# Patient Record
Sex: FEMALE | Race: OTHER | NOT HISPANIC OR LATINO | URBAN - METROPOLITAN AREA
[De-identification: names, ages, dates, MRNs, and addresses within clinical notes are randomized per-mention and may not be internally consistent; named-entity substitution may affect disease eponyms.]

---

## 2017-12-23 ENCOUNTER — EMERGENCY (EMERGENCY)
Facility: HOSPITAL | Age: 22
LOS: 1 days | Discharge: HOME | End: 2017-12-23
Attending: EMERGENCY MEDICINE | Admitting: EMERGENCY MEDICINE
Payer: COMMERCIAL

## 2017-12-23 VITALS
TEMPERATURE: 98 F | RESPIRATION RATE: 16 BRPM | OXYGEN SATURATION: 100 % | SYSTOLIC BLOOD PRESSURE: 144 MMHG | DIASTOLIC BLOOD PRESSURE: 84 MMHG | HEART RATE: 85 BPM

## 2017-12-23 PROCEDURE — 99282 EMERGENCY DEPT VISIT SF MDM: CPT

## 2017-12-23 PROCEDURE — 99283 EMERGENCY DEPT VISIT LOW MDM: CPT

## 2017-12-23 RX ORDER — ISOTRETINOIN 20 MG/1
0 CAPSULE, LIQUID FILLED ORAL
Qty: 0 | Refills: 0 | COMMUNITY

## 2017-12-23 NOTE — ED PROVIDER NOTE - OBJECTIVE STATEMENT
22 F no pmh, p/w throat pain, nasal congestion and L ear pain since last night. NO fever/chills. Took tylenol this AM with some relief. NO difficulty swallowing. No cough or respiratory distress. Clear nasal dicharge.

## 2017-12-23 NOTE — ED ADULT NURSE NOTE - OBJECTIVE STATEMENT
pt c/o sore throat and nasal congestion for one day.  no swallowing problems and no respiratory or speaking problems

## 2023-10-15 NOTE — ED ADULT NURSE NOTE - FINAL NURSING ELECTRONIC SIGNATURE
acetaminophen 325 mg oral tablet: 2 tab(s) orally every 6 hours, As Needed for mild to moderate pain  apixaban 2.5 mg oral tablet: 1 tab(s) orally 2 times a day  Calcium 600+D 600 mg-5 mcg (200 intl units) oral tablet: 1 tab(s) orally once a day  cefdinir 300 mg oral capsule: 1 cap(s) orally 2 times a day   cyclobenzaprine 5 mg oral tablet: 1 tab(s) orally 3 times a day, As needed, Muscle Spasm  gabapentin 100 mg oral capsule: 1 cap(s) orally 2 times a day  gabapentin 300 mg oral capsule: 1 cap(s) orally once a day (at bedtime)  levothyroxine 50 mcg (0.05 mg) oral tablet: 1 tab(s) orally once a day  lidocaine 4% topical film: Apply topically to affected area once a day   melatonin 3 mg oral tablet: 1 tab(s) orally once a day (at bedtime), As needed, Insomnia  metoprolol tartrate 50 mg oral tablet: 1 tab(s) orally 2 times a day  MiraLax oral powder for reconstitution: 17 gram(s) orally once a day, As Needed for constipation   Multiple Vitamins oral capsule: 1 cap(s) orally once a day  sodium chloride 1 g oral tablet: 1 tab(s) orally 3 times a day   spironolactone 25 mg oral tablet: 1 tab(s) orally once a day  traMADol 50 mg oral tablet: 1 tab(s) orally every 8 hours, As Needed for severe pain MDD:3 tabs  Vitamin C 500 mg oral tablet: 1 tab(s) orally once a day   acetaminophen 325 mg oral tablet: 2 tab(s) orally every 6 hours, As Needed for mild to moderate pain  apixaban 2.5 mg oral tablet: 1 tab(s) orally 2 times a day  bisacodyl 5 mg oral delayed release tablet: 1 tab(s) orally every 12 hours As needed Constipation  Calcium 600+D 600 mg-5 mcg (200 intl units) oral tablet: 1 tab(s) orally once a day  cyclobenzaprine 5 mg oral tablet: 1 tab(s) orally 3 times a day, As needed, Muscle Spasm  gabapentin 100 mg oral capsule: 1 cap(s) orally 2 times a day  gabapentin 300 mg oral capsule: 1 cap(s) orally once a day (at bedtime)  levothyroxine 50 mcg (0.05 mg) oral tablet: 1 tab(s) orally once a day  lidocaine 4% topical film: Apply topically to affected area once a day   metoprolol tartrate 50 mg oral tablet: 1 tab(s) orally 2 times a day  MiraLax oral powder for reconstitution: 17 gram(s) orally once a day, As Needed for constipation   Multiple Vitamins oral capsule: 1 cap(s) orally once a day  senna leaf extract oral tablet: 2 tab(s) orally once a day (at bedtime)  sodium chloride 1 g oral tablet: 1 tab(s) orally 3 times a day   traMADol 50 mg oral tablet: 1 tab(s) orally every 6 hours 1 tab for severe pain, 0.5 tab for moderate pain MDD: 4  Vitamin C 500 mg oral tablet: 1 tab(s) orally once a day   23-Dec-2017 22:06 acetaminophen 325 mg oral tablet: 2 tab(s) orally every 6 hours, As Needed for mild to moderate pain  apixaban 2.5 mg oral tablet: 1 tab(s) orally 2 times a day  bisacodyl 5 mg oral delayed release tablet: 1 tab(s) orally every 12 hours As needed Constipation  Calcium 600+D 600 mg-5 mcg (200 intl units) oral tablet: 1 tab(s) orally once a day  cyclobenzaprine 5 mg oral tablet: 1 tab(s) orally 3 times a day, As needed, Muscle Spasm  gabapentin 100 mg oral capsule: 1 cap(s) orally 2 times a day  gabapentin 300 mg oral capsule: 1 cap(s) orally once a day (at bedtime)  levothyroxine 50 mcg (0.05 mg) oral tablet: 1 tab(s) orally once a day  lidocaine 4% topical film: Apply topically to affected area once a day   metoprolol tartrate 50 mg oral tablet: 1 tab(s) orally 2 times a day  MiraLax oral powder for reconstitution: 17 gram(s) orally once a day, As Needed for constipation   Multiple Vitamins oral capsule: 1 cap(s) orally once a day  senna leaf extract oral tablet: 2 tab(s) orally once a day (at bedtime)  sodium chloride 1 g oral tablet: 1 tab(s) orally 3 times a day  traMADol 50 mg oral tablet: 1 tab(s) orally every 6 hours 1 tab for severe pain, 0.5 tab for moderate pain MDD: 4  Vitamin C 500 mg oral tablet: 1 tab(s) orally once a day

## 2024-08-29 NOTE — ED ADULT NURSE NOTE - DISCHARGE DATE/TIME
H&P Update    Patient's History and Physical from August 27,  was reviewed.    Patient examined.    There has been no change.      Chang Manning M.D., FACS.  8/29/2024  10:35 AM      
23-Dec-2017 22:06